# Patient Record
Sex: MALE | Race: AMERICAN INDIAN OR ALASKA NATIVE
[De-identification: names, ages, dates, MRNs, and addresses within clinical notes are randomized per-mention and may not be internally consistent; named-entity substitution may affect disease eponyms.]

---

## 2019-04-01 ENCOUNTER — HOSPITAL ENCOUNTER (EMERGENCY)
Dept: HOSPITAL 42 - ED | Age: 29
Discharge: HOME | End: 2019-04-01
Payer: MEDICAID

## 2019-04-01 VITALS — DIASTOLIC BLOOD PRESSURE: 67 MMHG | SYSTOLIC BLOOD PRESSURE: 120 MMHG | HEART RATE: 69 BPM | OXYGEN SATURATION: 100 %

## 2019-04-01 VITALS — BODY MASS INDEX: 34.7 KG/M2

## 2019-04-01 VITALS — TEMPERATURE: 98 F | RESPIRATION RATE: 18 BRPM

## 2019-04-01 DIAGNOSIS — R21: Primary | ICD-10-CM
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
## 2019-04-02 NOTE — RAD
Date of service: 



04/01/2019



PROCEDURE:  CHEST RADIOGRAPH, 1 VIEW



HISTORY:

cp, rash



COMPARISON:

None available.



FINDINGS:



LUNGS:

Clear.



PLEURA:

No pneumothorax or pleural fluid seen.



CARDIOVASCULAR:

 No aortic atherosclerotic calcification present. Normal.



OSSEOUS STRUCTURES:

No significant abnormalities.



VISUALIZED UPPER ABDOMEN:

Normal.



OTHER FINDINGS:

None. 



IMPRESSION:

No active disease.

## 2019-04-02 NOTE — CARD
--------------- APPROVED REPORT --------------





Date of service: 04/01/2019



EKG Measurement

Heart [id number]

SD [id number]

[id number]

[id number]

[id number]



<Conclusion>

Normal sinus rhythm

Normal Electrocardiogram

## 2019-04-30 ENCOUNTER — HOSPITAL ENCOUNTER (EMERGENCY)
Dept: HOSPITAL 42 - ED | Age: 29
Discharge: HOME | End: 2019-04-30
Payer: MEDICAID

## 2019-04-30 VITALS — HEART RATE: 63 BPM | DIASTOLIC BLOOD PRESSURE: 78 MMHG | SYSTOLIC BLOOD PRESSURE: 120 MMHG | TEMPERATURE: 98.3 F

## 2019-04-30 VITALS — BODY MASS INDEX: 34 KG/M2

## 2019-04-30 VITALS — OXYGEN SATURATION: 100 % | RESPIRATION RATE: 18 BRPM

## 2019-04-30 DIAGNOSIS — K21.9: Primary | ICD-10-CM

## 2019-04-30 DIAGNOSIS — F17.210: ICD-10-CM

## 2019-04-30 DIAGNOSIS — R07.9: ICD-10-CM

## 2019-04-30 LAB
ALBUMIN SERPL-MCNC: 4.2 G/DL (ref 3–4.8)
ALBUMIN/GLOB SERPL: 1.5 {RATIO} (ref 1.1–1.8)
ALT SERPL-CCNC: 35 U/L (ref 7–56)
APPEARANCE UR: CLEAR
APTT BLD: 31.2 SECONDS (ref 26.9–38.3)
AST SERPL-CCNC: 33 U/L (ref 17–59)
BASOPHILS # BLD AUTO: 0.03 K/MM3 (ref 0–2)
BASOPHILS NFR BLD: 0.5 % (ref 0–3)
BILIRUB UR-MCNC: NEGATIVE MG/DL
BUN SERPL-MCNC: 15 MG/DL (ref 7–21)
CALCIUM SERPL-MCNC: 9 MG/DL (ref 8.4–10.5)
CK MB SERPL-MCNC: 2.2 NG/ML (ref 0–3.6)
COLOR UR: YELLOW
EOSINOPHIL # BLD: 0.2 10*3/UL (ref 0–0.7)
EOSINOPHIL NFR BLD: 3.4 % (ref 1.5–5)
ERYTHROCYTE [DISTWIDTH] IN BLOOD BY AUTOMATED COUNT: 14.1 % (ref 11.5–14.5)
GFR NON-AFRICAN AMERICAN: > 60
GLUCOSE UR STRIP-MCNC: NEGATIVE MG/DL
HGB BLD-MCNC: 14.9 G/DL (ref 14–18)
INR PPP: 1.08
LEUKOCYTE ESTERASE UR-ACNC: NEGATIVE LEU/UL
LYMPHOCYTES # BLD: 1.9 10*3/UL (ref 1.2–3.4)
LYMPHOCYTES NFR BLD AUTO: 30 % (ref 22–35)
MCH RBC QN AUTO: 28.7 PG (ref 25–35)
MCHC RBC AUTO-ENTMCNC: 34.3 G/DL (ref 31–37)
MCV RBC AUTO: 83.7 FL (ref 80–105)
MONOCYTES # BLD AUTO: 0.6 10*3/UL (ref 0.1–0.6)
MONOCYTES NFR BLD: 8.7 % (ref 1–6)
PH UR STRIP: 6 [PH] (ref 4.7–8)
PLATELET # BLD: 219 10^3/UL (ref 120–450)
PMV BLD AUTO: 12.7 FL (ref 7–11)
PROT UR STRIP-MCNC: NEGATIVE MG/DL
PROTHROMBIN TIME: 12 SECONDS (ref 9.4–12.5)
RBC # BLD AUTO: 5.2 10^6/UL (ref 3.5–6.1)
RBC # UR STRIP: NEGATIVE /UL
SP GR UR STRIP: 1.01 (ref 1–1.03)
TROPONIN I SERPL-MCNC: < 0.01 NG/ML
UROBILINOGEN UR STRIP-ACNC: 0.2 E.U./DL
WBC # BLD AUTO: 6.4 10^3/UL (ref 4.5–11)

## 2019-04-30 PROCEDURE — 96374 THER/PROPH/DIAG INJ IV PUSH: CPT

## 2019-04-30 PROCEDURE — 80053 COMPREHEN METABOLIC PANEL: CPT

## 2019-04-30 PROCEDURE — 85025 COMPLETE CBC W/AUTO DIFF WBC: CPT

## 2019-04-30 PROCEDURE — 83690 ASSAY OF LIPASE: CPT

## 2019-04-30 PROCEDURE — 80361 OPIATES 1 OR MORE: CPT

## 2019-04-30 PROCEDURE — 81003 URINALYSIS AUTO W/O SCOPE: CPT

## 2019-04-30 PROCEDURE — 80346 BENZODIAZEPINES1-12: CPT

## 2019-04-30 PROCEDURE — 83992 ASSAY FOR PHENCYCLIDINE: CPT

## 2019-04-30 PROCEDURE — 71045 X-RAY EXAM CHEST 1 VIEW: CPT

## 2019-04-30 PROCEDURE — 85610 PROTHROMBIN TIME: CPT

## 2019-04-30 PROCEDURE — 82550 ASSAY OF CK (CPK): CPT

## 2019-04-30 PROCEDURE — 80345 DRUG SCREENING BARBITURATES: CPT

## 2019-04-30 PROCEDURE — 84484 ASSAY OF TROPONIN QUANT: CPT

## 2019-04-30 PROCEDURE — 80353 DRUG SCREENING COCAINE: CPT

## 2019-04-30 PROCEDURE — 80320 DRUG SCREEN QUANTALCOHOLS: CPT

## 2019-04-30 PROCEDURE — 80358 DRUG SCREENING METHADONE: CPT

## 2019-04-30 PROCEDURE — 85730 THROMBOPLASTIN TIME PARTIAL: CPT

## 2019-04-30 PROCEDURE — 99283 EMERGENCY DEPT VISIT LOW MDM: CPT

## 2019-04-30 PROCEDURE — 93005 ELECTROCARDIOGRAM TRACING: CPT

## 2019-04-30 PROCEDURE — 82553 CREATINE MB FRACTION: CPT

## 2019-04-30 PROCEDURE — 83735 ASSAY OF MAGNESIUM: CPT

## 2019-04-30 PROCEDURE — 80349 CANNABINOIDS NATURAL: CPT

## 2019-04-30 PROCEDURE — 83615 LACTATE (LD) (LDH) ENZYME: CPT

## 2019-04-30 PROCEDURE — 80324 DRUG SCREEN AMPHETAMINES 1/2: CPT

## 2019-04-30 NOTE — RAD
HISTORY:

 SOB 



COMPARISON:

Chest x-ray performed 4/1/19 



TECHNIQUE:

Chest, one view.



FINDINGS:

Examination limited by habitus.



LUNGS:

No focal consolidation.



Please note that chest x-ray has limited sensitivity for the 

detection of pulmonary masses.



PLEURA:

No significant pleural effusion identified. No definite pneumothorax .



CARDIOVASCULAR:

Heart size appears within normal limits.  Atherosclerotic 

calcifications of the aorta.  No significant atherosclerotic 

calcifications identified. 



OSSEOUS STRUCTURES:

No acute osseous abnormality identified.



VISUALIZED UPPER ABDOMEN:

Unremarkable.



OTHER FINDINGS:

None.



IMPRESSION:

No acute findings identified.

## 2019-04-30 NOTE — CARD
--------------- APPROVED REPORT --------------





Date of service: 04/30/2019



EKG Measurement

Heart [id number]

MD [id number]

[id number]

[id number]

[id number]



<Conclusion>

Normal sinus rhythm

Possible Lateral infarct, age undetermined

Diffuse mild ST elevation- probable early repolarization, a normal 

variant

Abnormal ECG
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
